# Patient Record
Sex: MALE | Race: ASIAN
[De-identification: names, ages, dates, MRNs, and addresses within clinical notes are randomized per-mention and may not be internally consistent; named-entity substitution may affect disease eponyms.]

---
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
## 2023-03-01 NOTE — ED PEDIATRIC TRIAGE NOTE - SOURCE OF INFORMATION
Received call from Olivia ( Nurse At [facility])  States patient had 2 diff.Abx, Cefepime 1 g Q 8 hours and IV VAnco 750 mg once a  day.Olivia Would like to know if  its okay to D/C Cefepime.We will consult ID Doctor.   Patient